# Patient Record
(demographics unavailable — no encounter records)

---

## 2018-09-17 NOTE — OP
DATE OF PROCEDURE:  09/17/2018

 

PREOPERATIVE DIAGNOSIS:  Dental infection.

 

POSTOPERATIVE DIAGNOSIS:  Dental infection.

 

PROCEDURE:  Oral rehabilitation under general anesthesia.

 

REASON FOR TRIP TO THE OPERATING ROOM:  Situational anxiety.  The patient was attempted to be treated
 in our clinic with no success.

 

SURGEON:  Josue Fabian D.M.D.

 

ANESTHESIA USED:  Sevoflurane.

 

COMPLICATIONS:  None.

 

ESTIMATED BLOOD LOSS:  Less than 2 mL.

 

PROCEDURE IN DETAIL:  The patient was brought to the operating room and placed in supine position.  I
V was placed in the patient's left hand.  General anesthesia was achieved via nasotracheal intubation
 to the right naris.  The patient was draped in the usual manner for dental procedures.  After drapin
g the patient with a lead apron, 8 radiographs were taken.  All secretions were suctioned from the or
al cavity and a moist sponge was placed in the back of the oropharynx as a throat pack.  It was deter
mined that teeth C, D, E, F, G and H were carious.  Teeth C and H were restored with stainless steel 
crowns.  Teeth D, E, F, and G had 5 minute formocresol pulpotomies performed and restored with aesthe
tic crowns.  Full mouth prophylaxis prophy paste rubber cup was performed followed by fluoride varnis
h.  The patient's intraoral cavity was suctioned free of all blood and secretions.  Throat pack was r
emoved.  The patient was extubated and breathing spontaneously in the operating room.  The patient wa
s then transferred to the PACU in stable condition.